# Patient Record
Sex: FEMALE | Race: BLACK OR AFRICAN AMERICAN | NOT HISPANIC OR LATINO | Employment: FULL TIME | URBAN - METROPOLITAN AREA
[De-identification: names, ages, dates, MRNs, and addresses within clinical notes are randomized per-mention and may not be internally consistent; named-entity substitution may affect disease eponyms.]

---

## 2019-02-08 ENCOUNTER — HOSPITAL ENCOUNTER (EMERGENCY)
Facility: HOSPITAL | Age: 24
Discharge: HOME | End: 2019-02-08
Attending: EMERGENCY MEDICINE | Admitting: EMERGENCY MEDICINE

## 2019-02-08 ENCOUNTER — APPOINTMENT (OUTPATIENT)
Dept: GENERAL RADIOLOGY | Facility: HOSPITAL | Age: 24
End: 2019-02-08

## 2019-02-08 VITALS
WEIGHT: 293 LBS | HEART RATE: 88 BPM | HEIGHT: 70 IN | BODY MASS INDEX: 41.95 KG/M2 | SYSTOLIC BLOOD PRESSURE: 142 MMHG | TEMPERATURE: 98.5 F | RESPIRATION RATE: 18 BRPM | DIASTOLIC BLOOD PRESSURE: 70 MMHG | OXYGEN SATURATION: 96 %

## 2019-02-08 DIAGNOSIS — S60.212A CONTUSION OF LEFT WRIST, INITIAL ENCOUNTER: ICD-10-CM
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
## 2019-02-08 DIAGNOSIS — S50.811A ABRASION OF RIGHT FOREARM, INITIAL ENCOUNTER: ICD-10-CM

## 2019-02-08 PROCEDURE — 99283 EMERGENCY DEPT VISIT LOW MDM: CPT

## 2019-02-08 PROCEDURE — 73110 X-RAY EXAM OF WRIST: CPT

## 2019-02-08 RX ORDER — FLUOXETINE 10 MG/1
10 CAPSULE ORAL DAILY
COMMUNITY

## 2019-02-08 RX ORDER — HYDROCHLOROTHIAZIDE 12.5 MG/1
12.5 CAPSULE, GELATIN COATED ORAL DAILY
COMMUNITY
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
## 2022-09-28 ENCOUNTER — TRANSCRIBE ORDERS (OUTPATIENT)
Dept: PHYSICAL THERAPY | Facility: CLINIC | Age: 27
End: 2022-09-28

## 2022-09-28 DIAGNOSIS — G89.29 CHRONIC BILATERAL LOW BACK PAIN WITH SCIATICA, SCIATICA LATERALITY UNSPECIFIED: Primary | ICD-10-CM

## 2022-09-28 DIAGNOSIS — M54.40 CHRONIC BILATERAL LOW BACK PAIN WITH SCIATICA, SCIATICA LATERALITY UNSPECIFIED: Primary | ICD-10-CM

## 2022-10-10 ENCOUNTER — TREATMENT (OUTPATIENT)
Dept: PHYSICAL THERAPY | Facility: CLINIC | Age: 27
End: 2022-10-10

## 2022-10-10 DIAGNOSIS — G89.29 CHRONIC BILATERAL LOW BACK PAIN WITH BILATERAL SCIATICA: Primary | ICD-10-CM

## 2022-10-10 DIAGNOSIS — M54.42 CHRONIC BILATERAL LOW BACK PAIN WITH BILATERAL SCIATICA: Primary | ICD-10-CM

## 2022-10-10 DIAGNOSIS — M54.41 CHRONIC BILATERAL LOW BACK PAIN WITH BILATERAL SCIATICA: Primary | ICD-10-CM

## 2022-10-10 PROCEDURE — 97110 THERAPEUTIC EXERCISES: CPT | Performed by: PHYSICAL THERAPIST

## 2022-10-10 PROCEDURE — 97112 NEUROMUSCULAR REEDUCATION: CPT | Performed by: PHYSICAL THERAPIST

## 2022-10-10 PROCEDURE — 97161 PT EVAL LOW COMPLEX 20 MIN: CPT | Performed by: PHYSICAL THERAPIST
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
## 2022-10-10 NOTE — PATIENT INSTRUCTIONS
Access Code: [email or web address]  URL: [email or web address]  Date: 10/10/2022  Prepared by: Kelli Erickson    Exercises  Supine Posterior Pelvic Tilt - 1-2 x daily - 15-30 reps  Supine March with Posterior Pelvic Tilt - 1-2 x daily - 15-30 reps  Supine Lower Trunk Rotation - 1-2 x daily - 10 reps  Supine Bridge - 1-2 x daily - 15-30 reps  Seated Piriformis Stretch - 1-2 x daily - 3 reps - 20 sec hold  Seated Figure 4 Piriformis Stretch - 1-2 x daily - 3 reps - 20 sec hold

## 2022-10-14 ENCOUNTER — TREATMENT (OUTPATIENT)
Dept: PHYSICAL THERAPY | Facility: CLINIC | Age: 27
End: 2022-10-14

## 2022-10-14 DIAGNOSIS — M54.42 CHRONIC BILATERAL LOW BACK PAIN WITH BILATERAL SCIATICA: Primary | ICD-10-CM

## 2022-10-14 DIAGNOSIS — M54.41 CHRONIC BILATERAL LOW BACK PAIN WITH BILATERAL SCIATICA: Primary | ICD-10-CM

## 2022-10-14 DIAGNOSIS — G89.29 CHRONIC BILATERAL LOW BACK PAIN WITH BILATERAL SCIATICA: Primary | ICD-10-CM

## 2022-10-14 PROCEDURE — 97112 NEUROMUSCULAR REEDUCATION: CPT | Performed by: PHYSICAL THERAPIST

## 2022-10-14 PROCEDURE — 97110 THERAPEUTIC EXERCISES: CPT | Performed by: PHYSICAL THERAPIST

## 2022-10-14 NOTE — PROGRESS NOTES
Physical Therapy Daily Treatment Note  [facility] [place] [facility]    [address] [place] [address]      Patient: Bisi Aldrich   : 1995  Referring practitioner: Sherri Paul MD  Date of Initial Visit: Type: THERAPY  Noted: 10/10/2022  Today's Date: 10/14/2022  Patient seen for 2 sessions       Visit Diagnoses:    ICD-10-CM ICD-9-CM   1. Chronic bilateral low back pain with bilateral sciatica  M54.42 724.2    M54.41 724.3    G89.29 338.29       Subjective   Bisi Aldrich reports: Was a little sore from her exercises. Experiences random low back spasms that leave her sore. Not having much pn so far this morning.    Pre tx pn score: 0  Post tx pn score: 0    Treatment  See Exercise, Manual, and Modality Logs for complete treatment.     Objective         Assessment & Plan     Assessment    Assessment details: Pt compliant w/ initial HEP and reportedly tolerating exercises well, mildly sore afterward. Continued w/ focus on low level core strengthening emphasizing trans ab activation, hip strengthening, and low back stretching. Noted significant fatigue w/ L hip exercises vs R. Reports pn w/ bridges so deferred and discussed removing from HEP for now, replacing w/ hip abd/extn. She verbalized understanding.    Plan  Plan details: Cont PT          Timed:         Manual Therapy:    0     mins  25967;     Therapeutic Exercise:    24     mins  85788;     Neuromuscular Akhil:    8    mins  76809;    Therapeutic Activity:     0     mins  84271;     Gait Trainin     mins  47476;     Ultrasound:     0     mins  08721;    Ionto                               0    mins   26694  Self Care                       0     mins   34009  Canalith Repos    0     mins 48598      Un-Timed:  Electrical Stimulation:    0     mins  19711 ( );  Dry Needling     0     mins self-pay  Traction     0     mins 69298      Timed Treatment:   32   mins   Total Treatment:     32   mins    Kelli Erickson, PT  [place] License:  #[id number]

## 2022-10-18 ENCOUNTER — TREATMENT (OUTPATIENT)
Dept: PHYSICAL THERAPY | Facility: CLINIC | Age: 27
End: 2022-10-18

## 2022-10-18 DIAGNOSIS — G89.29 CHRONIC BILATERAL LOW BACK PAIN WITH BILATERAL SCIATICA: Primary | ICD-10-CM

## 2022-10-18 DIAGNOSIS — M54.41 CHRONIC BILATERAL LOW BACK PAIN WITH BILATERAL SCIATICA: Primary | ICD-10-CM

## 2022-10-18 DIAGNOSIS — M54.42 CHRONIC BILATERAL LOW BACK PAIN WITH BILATERAL SCIATICA: Primary | ICD-10-CM

## 2022-10-18 PROCEDURE — 97110 THERAPEUTIC EXERCISES: CPT | Performed by: PHYSICAL THERAPIST

## 2022-10-18 PROCEDURE — 97112 NEUROMUSCULAR REEDUCATION: CPT | Performed by: PHYSICAL THERAPIST

## 2022-10-18 NOTE — PROGRESS NOTES
Physical Therapy Daily Treatment Note  [facility] [place] [facility]    [address] [place] [address]      Patient: Bisi Aldrich   : 1995  Referring practitioner: Sherri Paul MD  Date of Initial Visit: Type: THERAPY  Noted: 10/10/2022  Today's Date: 10/18/2022  Patient seen for 3 sessions       Visit Diagnoses:    ICD-10-CM ICD-9-CM   1. Chronic bilateral low back pain with bilateral sciatica  M54.42 724.2    M54.41 724.3    G89.29 338.29       Subjective   Bisi Aldrich reports: No issues after last visit. Hurting today because she has been dog sitting, having to valente a husky around. Feels aching across B low back, worst on R.    Pre tx pn score: 5  Post tx pn score: 3    Treatment  See Exercise, Manual, and Modality Logs for complete treatment.     Objective         Assessment & Plan     Assessment    Assessment details: No issues reported after previous visit, minimally sore. Continued w/ trunk/hip strengthening. Progressed to clamshells, resisted side steps. Pt noted fatigue but no inc in pn. Pn level reduced at end of visits. Discussed adding clams/SL hip abd to HEP. She verbalized understanding.     Plan  Plan details: Cont w/ strengthening- (+) LE glides, resisted rows standing          Timed:         Manual Therapy:    0     mins  55736;     Therapeutic Exercise:    25     mins  84705;     Neuromuscular Akhil:    10    mins  25230;    Therapeutic Activity:     0     mins  10728;     Gait Trainin     mins  09901;     Ultrasound:     0     mins  09263;    Ionto                               0    mins   32166  Self Care                       0     mins   89480  Canalith Repos    0     mins 45335      Un-Timed:  Electrical Stimulation:    0     mins  71420 ( );  Dry Needling     0     mins self-pay  Traction     0     mins 63463      Timed Treatment:   35   mins   Total Treatment:     45   mins    Kelli Erickson, PT  [place] License: #[id number]

## 2022-10-20 ENCOUNTER — TREATMENT (OUTPATIENT)
Dept: PHYSICAL THERAPY | Facility: CLINIC | Age: 27
End: 2022-10-20

## 2022-10-20 DIAGNOSIS — M54.41 CHRONIC BILATERAL LOW BACK PAIN WITH BILATERAL SCIATICA: Primary | ICD-10-CM

## 2022-10-20 DIAGNOSIS — M54.42 CHRONIC BILATERAL LOW BACK PAIN WITH BILATERAL SCIATICA: Primary | ICD-10-CM

## 2022-10-20 DIAGNOSIS — G89.29 CHRONIC BILATERAL LOW BACK PAIN WITH BILATERAL SCIATICA: Primary | ICD-10-CM

## 2022-10-20 PROCEDURE — 97110 THERAPEUTIC EXERCISES: CPT | Performed by: PHYSICAL THERAPIST

## 2022-10-20 PROCEDURE — 97112 NEUROMUSCULAR REEDUCATION: CPT | Performed by: PHYSICAL THERAPIST

## 2022-10-20 PROCEDURE — 97530 THERAPEUTIC ACTIVITIES: CPT | Performed by: PHYSICAL THERAPIST

## 2022-10-20 NOTE — PATIENT INSTRUCTIONS
Access Code: [email or web address]  URL: [email or web address]  Date: 10/20/2022  Prepared by: Kelli Erickson    Exercises  Supine Posterior Pelvic Tilt - 1-2 x daily - 10 reps  Supine March with Posterior Pelvic Tilt - 1-2 x daily - 15-30 reps  Supine Pelvic Tilt with leg glides - 2-3 x daily - 15-30 reps  Supine Lower Trunk Rotation - 1-2 x daily - 10 reps  Seated Piriformis Stretch - 1-2 x daily - 3 reps - 20 sec hold  Seated Figure 4 Piriformis Stretch - 1-2 x daily - 3 reps - 20 sec hold  Side Stepping with Resistance at Thighs - 1-2 x daily - 15-30 reps  Prone Hip Extension on Table - 1-2 x daily - 15-30 reps

## 2022-10-20 NOTE — PROGRESS NOTES
Physical Therapy Daily Treatment Note  [facility] [place] [facility]    [address] [place] [address]      Patient: Bisi Aldrich   : 1995  Referring practitioner: Sherri Paul MD  Date of Initial Visit: Type: THERAPY  Noted: 10/10/2022  Today's Date: 10/20/2022  Patient seen for 4 sessions       Visit Diagnoses:    ICD-10-CM ICD-9-CM   1. 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 Had to walk down 8 flights of stairs and stand outside for 40 mins. L hip began to hurt, not as bad as it typically would, but is sore today.     Pre tx pn score: 6  Post tx pn score: 3    Treatment  See Exercise, Manual, and Modality Logs for complete treatment.     Objective         Assessment & Plan     Assessment    Assessment details: No issues reported w/ recent exercise progressions. Continued w/ hip/core strengthening. Progressed resisted walking patterns and initiated TG squats. She reported gluteal/hip abductor fatigue w/ all, no complaints of pn. Pn level reduced by end of visit. Issued updated HEP and provided TB for home use. Pt would benefit from ongoing PT.    Plan  Plan details: Assess response to updated HEP; Cont w/ hip strengthening- (+) TB core exercises          Timed:         Manual Therapy:    0     mins  84105;     Therapeutic Exercise:    23     mins  95088;     Neuromuscular Akhil:    8    mins  33939;    Therapeutic Activity:     9     mins  01220;     Gait Trainin     mins  59318;     Ultrasound:     0     mins  03515;    Ionto                               0    mins   59308  Self Care                       0     mins   68232  Canalith Repos    0     mins 73375      Un-Timed:  Electrical Stimulation:    0     mins  56340 (MC );  Dry Needling     0     mins self-pay  Traction     0     mins 07119      Timed Treatment:   40   mins   Total  Treatment:     40   mins    Kelli Erickson, PT  [place] License: #[id number]

## 2022-10-25 ENCOUNTER — TREATMENT (OUTPATIENT)
Dept: PHYSICAL THERAPY | Facility: CLINIC | Age: 27
End: 2022-10-25

## 2022-10-25 DIAGNOSIS — G89.29 CHRONIC BILATERAL LOW BACK PAIN WITH BILATERAL SCIATICA: Primary | ICD-10-CM

## 2022-10-25 DIAGNOSIS — M54.41 CHRONIC BILATERAL LOW BACK PAIN WITH BILATERAL SCIATICA: Primary | ICD-10-CM

## 2022-10-25 DIAGNOSIS — M54.42 CHRONIC BILATERAL LOW BACK PAIN WITH BILATERAL SCIATICA: Primary | ICD-10-CM

## 2022-10-25 PROCEDURE — 97530 THERAPEUTIC ACTIVITIES: CPT | Performed by: PHYSICAL THERAPIST

## 2022-10-25 PROCEDURE — 97110 THERAPEUTIC EXERCISES: CPT | Performed by: PHYSICAL THERAPIST

## 2022-10-25 PROCEDURE — 97112 NEUROMUSCULAR REEDUCATION: CPT | Performed by: PHYSICAL THERAPIST

## 2022-10-25 NOTE — PROGRESS NOTES
Physical Therapy Daily Treatment Note  [facility] [address] [facility]    [address] [place] [address]      Patient: Bisi Aldrich   : 1995  Referring practitioner: Sherri Pual MD  Date of Initial Visit: Type: THERAPY  Noted: 10/10/2022  Today's Date: 10/25/2022  Patient seen for 5 sessions       Visit Diagnoses:    ICD-10-CM ICD-9-CM   1. Chronic bilateral low back pain with bilateral sciatica  M54.42 724.2    M54.41 724.3    G89.29 338.29       Subjective   Bisi Aldrich reports: Overall feels that pn is improved. Waking first thing in the morning has always been painful, but is less intense now. Pn mostly right sided. Starting a new job soon so will need to take a break from PT until new insurance kicks in. No issues w/ new HEP.    Pre tx pn score: 6  Post tx pn score: 3    Treatment  See Exercise, Manual, and Modality Logs for complete treatment.     Objective         Assessment & Plan     Assessment    Assessment details: Doing well w/ updated HEP issued last visit. Reports improvement in pn since start of care-more localized to R low back, worst after periods of immobility. Continued w/ progression of core/hip strengthening as outlined in chart. Added dead bugs, bird dogs-she struggled somewhat maintaining neutral spine initially but improved w/ reps. Was able to resume bridges w/o inc in pn. Would benefit from ongoing PT.    Plan  Plan details: Cont w/ progression of core strengthening, low back stretching; add standing band pulls/paloff presses; update HEP          Timed:         Manual Therapy:    0     mins  20316;     Therapeutic Exercise:    15     mins  68242;     Neuromuscular Akhil:    15    mins  04989;    Therapeutic Activity:     10     mins  47821;     Gait Trainin     mins  87236;     Ultrasound:     0     mins  62654;    Ionto                               0    mins   59521  Self Care                       0     mins   13347  Canalith Repos    0     mins  30682      Un-Timed:  Electrical Stimulation:    0     mins  86924 ( );  Dry Needling     0     mins self-pay  Traction     0     mins 10157      Timed Treatment:   40   mins   Total Treatment:     40   mins    Kelli Erickson, PT  [place] License: #[id number]

## 2022-10-27 ENCOUNTER — TREATMENT (OUTPATIENT)
Dept: PHYSICAL THERAPY | Facility: CLINIC | Age: 27
End: 2022-10-27

## 2022-10-27 DIAGNOSIS — M54.42 CHRONIC BILATERAL LOW BACK PAIN WITH BILATERAL SCIATICA: Primary | ICD-10-CM

## 2022-10-27 DIAGNOSIS — M54.41 CHRONIC BILATERAL LOW BACK PAIN WITH BILATERAL SCIATICA: Primary | ICD-10-CM

## 2022-10-27 DIAGNOSIS — G89.29 CHRONIC BILATERAL LOW BACK PAIN WITH BILATERAL SCIATICA: Primary | ICD-10-CM

## 2022-10-27 PROCEDURE — 97530 THERAPEUTIC ACTIVITIES: CPT | Performed by: PHYSICAL THERAPIST

## 2022-10-27 PROCEDURE — 97110 THERAPEUTIC EXERCISES: CPT | Performed by: PHYSICAL THERAPIST

## 2022-10-27 PROCEDURE — 97112 NEUROMUSCULAR REEDUCATION: CPT | Performed by: PHYSICAL THERAPIST

## 2022-10-27 NOTE — PATIENT INSTRUCTIONS
Access Code: [email or web address]  URL: [email or web address]  Date: 10/27/2022  Prepared by: Kelli Erickson    Exercises  Supine Pelvic Tilt with leg glides - 1 x daily - 15-30 reps  Beginner Bridge - 1 x daily - 15-30 reps  Dead Bug - 1 x daily - 15-30 reps  Supine Dead Bug with Leg Extension - 1 x daily - 15-30 reps  Bird Dog - 1 x daily - 15-30 reps  Side Stepping with Resistance at Thighs - 1 x daily - 15-30 reps  Standing Anti-Rotation Press with Anchored Resistance - 1 x daily - 15-30 reps

## 2022-10-27 NOTE — PROGRESS NOTES
"Physical Therapy Daily Treatment Note  [facility] [address]    [address] [place] [address]      Patient: Bisi Aldrich   : 1995  Referring practitioner: Sherri Paul MD  Date of Initial Visit: Type: THERAPY  Noted: 10/10/2022  Today's Date: 10/27/2022  Patient seen for 6 sessions       Visit Diagnoses:    ICD-10-CM ICD-9-CM   1. Chronic bilateral low back pain with bilateral sciatica  M54.42 724.2    M54.41 724.3    G89.29 338.29       Subjective   Bisi Aldrich reports: Woke this morning to walk to the bathroom and actually forgot about her pn. \"My back has honestly never felt better.\" Still feels sore from time to time.    Pre tx pn score: 3  Post tx pn score: 2    Treatment  See Exercise, Manual, and Modality Logs for complete treatment.     Objective         Assessment & Plan     Assessment    Assessment details: LBP improving. Continued w/ core and hip strengthening progressions, low back stretching. Demo good carryover w/ progressions from last visit. Demo good control w/ bird dogs, subjectively reports challenge. Incorporated paloff presses to facilitate deep core stab function in standing. She completed all w/o pn and again reported slight reduction in pn by end of visit. Pt requests working on home exercises for next couple of weeks until new insurance becomes active. Issued/reviewed updated HEP and provided TB for home use.    Plan  Plan details: Functional strengthening/core stab          Timed:         Manual Therapy:    0     mins  97896;     Therapeutic Exercise:    15     mins  51878;     Neuromuscular Akhil:    10    mins  66151;    Therapeutic Activity:     10     mins  95015;     Gait Trainin     mins  98214;     Ultrasound:     0     mins  44149;    Ionto                               0    mins   48827  Self Care                       0     mins   19805  Canalith Repos    0     mins 68623      Un-Timed:  Electrical Stimulation:    0     mins  75733 ( " );  Dry Needling     0     mins self-pay  Traction     0     mins 54366      Timed Treatment:   35   mins   Total Treatment:     35   mins    Kelli Erickson, PT  [place] License: #[id number]

## 2025-06-09 RX ORDER — AMLODIPINE BESYLATE 10 MG/1
10 TABLET ORAL DAILY
COMMUNITY
Start: 2022-10-12 | End: 2025-10-20

## 2025-06-09 RX ORDER — PRENATAL VIT/IRON FUM/FOLIC AC 27MG-0.8MG
1 TABLET ORAL DAILY
COMMUNITY
Start: 2025-04-23

## 2025-06-09 RX ORDER — CHLORTHALIDONE 25 MG/1
25 TABLET ORAL DAILY
COMMUNITY
Start: 2022-07-15

## 2025-07-07 NOTE — PROGRESS NOTES
"  [facility]  [address]  [place] [address]   Office Note      Date: 2025  Patient Name: Bisi Aldrich  MRN: [id number]  : 1995  Subjective  Subjective     Chief Complaint  Obesity Management consult, nutrition counseling          Bisi Aldrich presents to [facility] for obesity management. She is a self-referral. Co-existing RUBINA, HTN, possible PCOS. Goal <300lb and improvement in health. She has never taken medications to treat her weight.     Weight history:  Reports that she has been overweight for more than 15 years. Her weight started to increase above her normal in the \"mid to high 200s\" in college when she was less active, played basketball in high school. She has tried multiple diet and exercise programs in the past including Noom, weight watchers, , Guru. Was most successful with guru program -2024- personal training program with diet plan- and lost about 35lb then injured her knee exercising and gained the weight back.   Highest lifetime weight: 385 pounds. Today's weight is (!) 175 kg (386 lb) pounds.   Weight 5 years ago: 330    Most recently she started at [employer] and works with a  lifting weights for about an hour twice a week.     Current lifestyle:   The following seem to sabotage weight loss efforts:comfort/stress eating, enjoyment of food, skipping meals, eating late, specific cravings like carbohydrates, mindless eating (snacking while working or watching TV), boredom eating, and poor sleep    Typical diet:  Breakfast: egg bites from [employer] and a refresher drink  Lunch: chicken sandwich or turkey burger  Dinner: protein and veggie or burger and fries  Snack:apples and PB    Pertinent medical history:  Depression/anxiety: was rx initially in 2018. Just restarted in February after being off for 2 years (lost insurance coverage). Took wellbuturin but can't " "recall much about it.   RUBINA: HST 2 years ago results were normal but did have an abnormal result in May 2025. Started on CPAP last week- managed by sleep specialist at [facility].  She has had ovarian polyps in the past but denies cysts or history of PCOS.   Pertinent family history:    Review of Systems   Constitutional:  Positive for fatigue.        Positive for weight gain   HENT:  Negative for trouble swallowing.         Negative for throat swelling   Respiratory:  Positive for apnea. Negative for shortness of breath and wheezing.         Negative for snoring   Cardiovascular:  Negative for chest pain, palpitations and leg swelling.   Gastrointestinal:  Negative for abdominal pain, constipation, diarrhea, GERD and indigestion.   Endocrine: Negative for cold intolerance, heat intolerance, polydipsia, polyphagia and polyuria.        Negative for loss of hair  Negative for hirsutism     Genitourinary:         Denies menstrual irregularities   Musculoskeletal:  Negative for arthralgias.        Denies exercise limitations  Denies chronic pain   Skin:  Negative for dry skin.        Negative for acne   Neurological:  Positive for memory problem. Negative for headache.        Negative for paresthesias   Psychiatric/Behavioral:  Positive for self-injury and depressed mood. Negative for sleep disturbance and suicidal ideas. The patient is nervous/anxious.        PHQ-9 Total Score: 15          Objective     Body mass index is 54.6 kg/m².   Body composition analysis completed and showed:   Body Fat %: 57.7%     Measurements (in inches)  Measurements (in inches) Waist Circumference: 53.5   Neck: 16.5  Chest: 58  Hips: 63  Thighs: 53    Vital Signs:   /84   Pulse 88   Resp 18   Ht 179.1 cm (70.5\")   Wt (!) 175 kg (386 lb)   SpO2 98%   BMI 54.60 kg/m²     Physical Exam   General appears stated age and normal appearance   HEENT Mallampati score III or IV, enlarged neck circumference, PERRLA, EOM intact, " conjunctivae normal, and without thyromegaly or thyroid nodules   Chest/lungs Normal rate, Regular rhythm, Breathing is unlabored, and Clear to auscultation bilaterally   Abdomen Hypopigmented linear striae, Soft, normal bowel sounds, without mass or tenderness, and Central adiposity   Extremities without edema   Neuro Good historian and No focal deficit   Skin Acanthosis nigricans and Warm, dry, intact   Psych normal behavior, normal thought content, and normal concentration     Result Review :   The following data was reviewed by: GISELA Bailey on 07/09/2025:  External labwork from 04/2025                Assessment / Plan       Diagnoses and all orders for this visit:    1. Class 3 severe obesity due to excess calories with serious comorbidity and body mass index (BMI) of 50.0 to 59.9 in adult (Primary)  Assessment & Plan:  Patient's (Body mass index is 54.6 kg/m².) indicates that they are morbidly/severely obese (BMI > 40 or > 35 with obesity - related health condition) with health conditions that include obstructive sleep apnea and hypertension . Weight is worsening. BMI  is above average; BMI management plan is completed. We discussed low calorie, low carb based diet program, portion control, increasing exercise, management of depression/anxiety/stress to control compensatory eating, and an deena-based approach such as [employer] Pal or Lose It.     Topics of discussion included obesity as a disease, nutritional education on food groups, exercise, and medications. Patient was instructed in adequate protein, controlled carb and controlled fat intake.   Patient received instructions on using the medicines as a tool in controlling their weight with nutritional and behavioral changes. Risks and benefits were discussed. I believe the potential benefits of medication helping to decrease weight outweighs the risks. Patient is to try nutritonal/behavioral changes only first.   Patient received our clinic education  booklet.   Our patient consent form was reviewed including potential risks of weight loss. We also reviewed our confidentiality and HIPPA statements. Patients current FITT score was reviewed along with current capability for exercise tolerance and a patient will work towards a FITT score of:     Frequency   Intensity Time Strength Training   []   0 None  []   0 None  []   0 None  []   0 None    []   1 (1-2x/week) []   1 (light) []   1 (<10 min) []   1 (1x/week)   [x]   2 (3-5x/week) [x]   2 (moderate) []   2 (10-20 min) [x]   2 (2x/week)   []   3 (daily)   []   3 (moderately hard)  []   4 (very hard) []   3 (20-30 min)  [x]   4 (>30 min) []   3 (3-4x/week)       Patient's past medical history was reviewed in detail and barriers to weight loss were identified and discussed. Past efforts at weight reduction on their own as well as under physician supervision were documented and discussed.  I advised patient to continue routine care with their Primary Care Provider.     Nutritional recommendations and goals were reviewed including Calories:9343-1544 daily adjusted for exercise calories burnt, Protein:108-136 g daily, Net carbs (total carb - fiber) of 50-75g per day.  Start to keep a food journal and bring into next visit in 2 weeks for review. Practice the behavioral modification technique of mindful eating. Take one MVI daily and 2000mg fish oil daily. Take other medications and supplements as directed.  - Complete fasting labwork today.   - Reviewed AOM: candidate for all meds. Caution phentermine due to HTN. Wegovy, qsymia, orlistat, phentermine preferred by insurance. Her weight loss goal for treatment of RUBINA and HTN is >20%, zepbound is the preferred starting treatment due to coexisting RUBINA but not covered. Will plan to start with wegovy at next visit.  Advised her to enroll in required [facility] healthcoach program.   - Treatment goal  >20% loss of beginning body weight, <300lb.     Orders:  -     Insulin, Total  -      Comprehensive Metabolic Panel  -     Lipid Panel  -     TSH  -     CBC (No Diff)  -     Vitamin D,25-Hydroxy  -     Vitamin B12    2. RUBINA on CPAP  Overview:  HST 05/22/2025 @  mild RUBINA with AHI 8.0    Assessment & Plan:  Recently diagnosed, started CPAP 1 week ago. Increased usage from 4 hours to 6 and is starting to feel somewhat better.     Orders:  -     Comprehensive Metabolic Panel    3. Benign essential hypertension  Assessment & Plan:  Hypertension is stable and controlled  Continue current treatment regimen.  Weight loss.  Regular aerobic exercise.  Ambulatory blood pressure monitoring.  Blood pressure will be reassessed in 1 month.    Orders:  -     Comprehensive Metabolic Panel  -     Lipid Panel  -     TSH    4. Anxiety and depression  Assessment & Plan:  Patient's depression is a recurrent episode that is severe without psychosis. Depression is active and stable.    Plan:   Continue current medication therapy   Managed by PCP. Consider wellbutrin. Has taken before, can't recall anything about it.     Followup at the next regular appointment.     Orders:  -     Comprehensive Metabolic Panel  -     TSH    5. Eczema, unspecified type    6. Chronic fatigue  -     Comprehensive Metabolic Panel  -     TSH  -     CBC (No Diff)  -     Vitamin D,25-Hydroxy  -     Vitamin B12    7. Malnutrition screen  -     Comprehensive Metabolic Panel  -     TSH  -     CBC (No Diff)  -     Vitamin D,25-Hydroxy  -     Vitamin B12    8. Encounter for therapeutic drug level monitoring  -     Urine Drug Screen - Urine, Clean Catch        I spent 61 minutes caring for Bisi on this date of service. This time includes time spent by me in the following activities:preparing for the visit, reviewing tests, obtaining and/or reviewing a separately obtained history, performing a medically appropriate examination and/or evaluation , counseling and educating the patient/family/caregiver, ordering medications, tests, or procedures, and  documenting information in the medical record  Follow Up   Return in about 2 weeks (around 7/23/2025) for Labs this visit, Next scheduled follow up.  Patient was given instructions and counseling regarding her condition or for health maintenance advice. Please see specific information pulled into the AVS if appropriate.     GISELA Giron  07/09/2025

## 2025-07-09 ENCOUNTER — OFFICE VISIT (OUTPATIENT)
Dept: BARIATRICS/WEIGHT MGMT | Facility: CLINIC | Age: 30
End: 2025-07-09
Payer: COMMERCIAL

## 2025-07-09 VITALS
DIASTOLIC BLOOD PRESSURE: 84 MMHG | BODY MASS INDEX: 41.02 KG/M2 | HEART RATE: 88 BPM | SYSTOLIC BLOOD PRESSURE: 124 MMHG | OXYGEN SATURATION: 98 % | HEIGHT: 71 IN | RESPIRATION RATE: 18 BRPM | WEIGHT: 293 LBS

## 2025-07-09 DIAGNOSIS — I10 BENIGN ESSENTIAL HYPERTENSION: ICD-10-CM

## 2025-07-09 DIAGNOSIS — G47.33 OSA ON CPAP: ICD-10-CM

## 2025-07-09 DIAGNOSIS — E66.813 CLASS 3 SEVERE OBESITY DUE TO EXCESS CALORIES WITH SERIOUS COMORBIDITY AND BODY MASS INDEX (BMI) OF 50.0 TO 59.9 IN ADULT: Primary | ICD-10-CM

## 2025-07-09 DIAGNOSIS — L30.9 ECZEMA, UNSPECIFIED TYPE: ICD-10-CM

## 2025-07-09 DIAGNOSIS — F41.9 ANXIETY AND DEPRESSION: ICD-10-CM

## 2025-07-09 DIAGNOSIS — Z51.81 ENCOUNTER FOR THERAPEUTIC DRUG LEVEL MONITORING: ICD-10-CM

## 2025-07-09 DIAGNOSIS — F32.A ANXIETY AND DEPRESSION: ICD-10-CM

## 2025-07-09 DIAGNOSIS — Z13.21 MALNUTRITION SCREEN: ICD-10-CM

## 2025-07-09 DIAGNOSIS — R53.82 CHRONIC FATIGUE: ICD-10-CM

## 2025-07-09 PROBLEM — E66.01 MORBID OBESITY: Status: ACTIVE | Noted: 2022-08-15

## 2025-07-09 NOTE — ASSESSMENT & PLAN NOTE
Patient's depression is a recurrent episode that is severe without psychosis. Depression is active and stable.    Plan:   Continue current medication therapy   Managed by PCP. Consider wellbutrin. Has taken before, can't recall anything about it.     Followup at the next regular appointment.

## 2025-07-09 NOTE — ASSESSMENT & PLAN NOTE
Patient's (Body mass index is 54.6 kg/m².) indicates that they are morbidly/severely obese (BMI > 40 or > 35 with obesity - related health condition) with health conditions that include obstructive sleep apnea and hypertension . Weight is worsening. BMI  is above average; BMI management plan is completed. We discussed low calorie, low carb based diet program, portion control, increasing exercise, management of depression/anxiety/stress to control compensatory eating, and an deena-based approach such as [employer] Pal or Lose It.     Topics of discussion included obesity as a disease, nutritional education on food groups, exercise, and medications. Patient was instructed in adequate protein, controlled carb and controlled fat intake.   Patient received instructions on using the medicines as a tool in controlling their weight with nutritional and behavioral changes. Risks and benefits were discussed. I believe the potential benefits of medication helping to decrease weight outweighs the risks. Patient is to try nutritonal/behavioral changes only first.   Patient received our clinic education booklet.   Our patient consent form was reviewed including potential risks of weight loss. We also reviewed our confidentiality and HIPPA statements. Patients current FITT score was reviewed along with current capability for exercise tolerance and a patient will work towards a FITT score of:     Frequency   Intensity Time Strength Training   []   0 None  []   0 None  []   0 None  []   0 None    []   1 (1-2x/week) []   1 (light) []   1 (<10 min) []   1 (1x/week)   [x]   2 (3-5x/week) [x]   2 (moderate) []   2 (10-20 min) [x]   2 (2x/week)   []   3 (daily)   []   3 (moderately hard)  []   4 (very hard) []   3 (20-30 min)  [x]   4 (>30 min) []   3 (3-4x/week)       Patient's past medical history was reviewed in detail and barriers to weight loss were identified and discussed. Past efforts at weight reduction on their own as well as  under physician supervision were documented and discussed.  I advised patient to continue routine care with their Primary Care Provider.     Nutritional recommendations and goals were reviewed including Calories:8553-3600 daily adjusted for exercise calories burnt, Protein:108-136 g daily, Net carbs (total carb - fiber) of 50-75g per day.  Start to keep a food journal and bring into next visit in 2 weeks for review. Practice the behavioral modification technique of mindful eating. Take one MVI daily and 2000mg fish oil daily. Take other medications and supplements as directed.  - Complete fasting labwork today.   - Reviewed AOM: candidate for all meds. Caution phentermine due to HTN. Wegovy, qsymia, orlistat, phentermine preferred by insurance. Her weight loss goal for treatment of RUBINA and HTN is >20%, zepbound is the preferred starting treatment due to coexisting RUBINA but not covered. Will plan to start with wegovy at next visit.  Advised her to enroll in required [facility] healthcoach program.   - Treatment goal  >20% loss of beginning body weight, <300lb.

## 2025-07-09 NOTE — ASSESSMENT & PLAN NOTE
Recently diagnosed, started CPAP 1 week ago. Increased usage from 4 hours to 6 and is starting to feel somewhat better.

## 2025-07-10 LAB
25(OH)D3+25(OH)D2 SERPL-MCNC: 17.5 NG/ML (ref 30–100)
ALBUMIN SERPL-MCNC: 4.1 G/DL (ref 3.5–5.2)
ALBUMIN/GLOB SERPL: 1.3 G/DL
ALP SERPL-CCNC: 81 U/L (ref 39–117)
ALT SERPL-CCNC: 14 U/L (ref 1–33)
AMPHETAMINES UR QL SCN: NEGATIVE NG/ML
AST SERPL-CCNC: 19 U/L (ref 1–32)
BARBITURATES UR QL SCN: NEGATIVE NG/ML
BENZODIAZ UR QL SCN: NEGATIVE NG/ML
BILIRUB SERPL-MCNC: 0.2 MG/DL (ref 0–1.2)
BUN SERPL-MCNC: 9 MG/DL (ref 6–20)
BUN/CREAT SERPL: 10.2 (ref 7–25)
BZE UR QL SCN: NEGATIVE NG/ML
CALCIUM SERPL-MCNC: 9 MG/DL (ref 8.6–10.5)
CANNABINOIDS UR QL SCN: NEGATIVE NG/ML
CHLORIDE SERPL-SCNC: 104 MMOL/L (ref 98–107)
CHOLEST SERPL-MCNC: 186 MG/DL (ref 0–200)
CO2 SERPL-SCNC: 24.8 MMOL/L (ref 22–29)
CREAT SERPL-MCNC: 0.88 MG/DL (ref 0.57–1)
CREAT UR-MCNC: 257.8 MG/DL (ref 20–300)
EGFRCR SERPLBLD CKD-EPI 2021: 90.8 ML/MIN/1.73
ERYTHROCYTE [DISTWIDTH] IN BLOOD BY AUTOMATED COUNT: 16.3 % (ref 12.3–15.4)
GLOBULIN SER CALC-MCNC: 3.1 GM/DL
GLUCOSE SERPL-MCNC: 88 MG/DL (ref 65–99)
HCT VFR BLD AUTO: 37.5 % (ref 34–46.6)
HDLC SERPL-MCNC: 63 MG/DL (ref 40–60)
HGB BLD-MCNC: 11.7 G/DL (ref 12–15.9)
INSULIN SERPL-ACNC: 26.4 UIU/ML (ref 2.6–24.9)
LABORATORY COMMENT REPORT: NORMAL
LDLC SERPL CALC-MCNC: 105 MG/DL (ref 0–100)
MCH RBC QN AUTO: 24.2 PG (ref 26.6–33)
MCHC RBC AUTO-ENTMCNC: 31.2 G/DL (ref 31.5–35.7)
MCV RBC AUTO: 77.5 FL (ref 79–97)
METHADONE UR QL SCN: NEGATIVE NG/ML
OPIATES UR QL SCN: NEGATIVE NG/ML
OXYCODONE+OXYMORPHONE UR QL SCN: NEGATIVE NG/ML
PCP UR QL: NEGATIVE NG/ML
PH UR: 5.6 [PH] (ref 4.5–8.9)
PLATELET # BLD AUTO: 307 10*3/MM3 (ref 140–450)
POTASSIUM SERPL-SCNC: 3.8 MMOL/L (ref 3.5–5.2)
PROPOXYPH UR QL SCN: NEGATIVE NG/ML
PROT SERPL-MCNC: 7.2 G/DL (ref 6–8.5)
RBC # BLD AUTO: 4.84 10*6/MM3 (ref 3.77–5.28)
SODIUM SERPL-SCNC: 140 MMOL/L (ref 136–145)
TRIGL SERPL-MCNC: 100 MG/DL (ref 0–150)
TSH SERPL DL<=0.005 MIU/L-ACNC: 1.62 UIU/ML (ref 0.27–4.2)
VIT B12 SERPL-MCNC: 727 PG/ML (ref 211–946)
VLDLC SERPL CALC-MCNC: 18 MG/DL (ref 5–40)
WBC # BLD AUTO: 6.66 10*3/MM3 (ref 3.4–10.8)

## 2025-07-23 ENCOUNTER — OFFICE VISIT (OUTPATIENT)
Dept: BARIATRICS/WEIGHT MGMT | Facility: CLINIC | Age: 30
End: 2025-07-23
Payer: COMMERCIAL

## 2025-07-23 VITALS
WEIGHT: 293 LBS | HEART RATE: 76 BPM | SYSTOLIC BLOOD PRESSURE: 120 MMHG | BODY MASS INDEX: 41.02 KG/M2 | HEIGHT: 71 IN | DIASTOLIC BLOOD PRESSURE: 72 MMHG

## 2025-07-23 DIAGNOSIS — D50.9 IRON DEFICIENCY ANEMIA, UNSPECIFIED IRON DEFICIENCY ANEMIA TYPE: ICD-10-CM

## 2025-07-23 DIAGNOSIS — E66.813 CLASS 3 SEVERE OBESITY DUE TO EXCESS CALORIES WITH SERIOUS COMORBIDITY AND BODY MASS INDEX (BMI) OF 50.0 TO 59.9 IN ADULT: Primary | ICD-10-CM

## 2025-07-23 DIAGNOSIS — E55.9 VITAMIN D DEFICIENCY: ICD-10-CM

## 2025-07-23 DIAGNOSIS — R25.2 MUSCLE CRAMP: ICD-10-CM

## 2025-07-23 PROCEDURE — 99214 OFFICE O/P EST MOD 30 MIN: CPT | Performed by: NURSE PRACTITIONER

## 2025-07-23 RX ORDER — SEMAGLUTIDE 0.25 MG/.5ML
0.25 INJECTION, SOLUTION SUBCUTANEOUS WEEKLY
Qty: 2 ML | Refills: 0 | COMMUNITY
Start: 2025-07-23

## 2025-07-23 RX ORDER — SEMAGLUTIDE 0.5 MG/.5ML
0.5 INJECTION, SOLUTION SUBCUTANEOUS WEEKLY
Qty: 2 ML | Refills: 0 | Status: SHIPPED | OUTPATIENT
Start: 2025-07-23

## 2025-07-23 RX ORDER — ERGOCALCIFEROL 1.25 MG/1
CAPSULE, LIQUID FILLED ORAL
Qty: 24 CAPSULE | Refills: 0 | Status: SHIPPED | OUTPATIENT
Start: 2025-07-23

## 2025-07-23 RX ORDER — MAGNESIUM GLUCONATE 30 MG(550)
595 TABLET ORAL DAILY
Start: 2025-07-23

## 2025-07-23 NOTE — ASSESSMENT & PLAN NOTE
Patient's (Body mass index is 54.32 kg/m².) indicates that they are morbidly/severely obese (BMI > 40 or > 35 with obesity - related health condition) with health conditions that include obstructive sleep apnea and hypertension . Weight is improving with lifestyle modifications. BMI  is above average; BMI management plan is completed. We discussed low calorie, low carb based diet program, portion control, increasing exercise, pharmacologic options including wegovy, and an deena-based approach such as [employer] Pal or Lose It.     I have instructed the patient to continue with pursuit of medical weight loss as a part of this program. Patient does meet criteria for use of anorectics at this time as BMI >30  and is not at treatment goal.     The current plan for this month includes:   - Continue to work on lifestyle behavioral changes  - Start wegovy.   Start Wegovy. Denies family or personal history of pancreatitis, MTC, or MEN 2. Discussed common side effects of nausea, diarrhea, vomiting, constipation, stomach pain, headache, fatigue, upset stomach, dizziness, feeling bloated, belching, gas, stomach flu, heartburn.   First injection of Wegovy 0.25mg was administered in the office today right upper arm, no complications. Sample pen provided to patient along with content regarding use of the pen, dosing schedule, savings opportunities, and helpful tips.   - Treatment goal >20% loss of beginning body weight, <300lb.

## 2025-07-23 NOTE — ASSESSMENT & PLAN NOTE
We discussed that adipose tissue sequesters Vitamin D and it often coexists with obesity. Treat with prescription dosing. Reassess in 6 months.

## 2025-07-23 NOTE — PROGRESS NOTES
[facility]  [address]  [place] [address]     Office Note      Date: 2025  Patient Name: Bisi Aldrich  MRN: [id number]  : 1995  Subjective  Subjective     Chief Complaint  Obesity Management follow-up          Bisi Aldrich presents to [facility] for obesity management. This is her initial 2 week follow up. She is working on the following lifestyle changes: keeping a food journal, decreasing carb intake.   Patient is unsure with weight loss progress. Appetite is moderately controlled. Reports no side effects of prescribed medications today. The patient is taking multivitamin and is not taking fish oil. The patient is using a food journal.      24 hour recall: 25  Calories: 1259  Carbs: 117  Fiber: 9  Protein: 69  Water Intake: 120 oz  Other beverages: Poppi soda (1) diet soda occasionally, [employer] Refresher 2 times weekly  Alcohol: occasionally    The patient is exercising by walking and on Mon and Wed sees a  for strength training for an hour with a FITT score of:    Frequency Intensity Time Strength Training   []   0, none []   0 []   0 []   0   [x]   1 (1-2x/week) [x]   1 (light) []   1 (<10 min) []   1 (1x/week)   []   2 (3-5x/week) []   2 (moderate) []   2 (10-20 min) [x]   2 (2x/week)   []   3 (daily) []   3 (moderately hard)  []   4 (very hard) []   3 (20-30 min)  [x]   4 (>30 min) []   3 (3-4x/week)           Review of Systems   Constitutional:  Negative for appetite change and fatigue.   Eyes:  Negative for visual disturbance.   Cardiovascular:  Negative for chest pain and palpitations.   Gastrointestinal:  Negative for constipation and indigestion.   Neurological:  Negative for light-headedness.         Current Outpatient Medications:     amLODIPine (NORVASC) 10 MG tablet, Take 1 tablet by mouth Daily., Disp: , Rfl:     chlorthalidone (HYGROTON) 25 MG tablet, Take 1 tablet by mouth  "Daily., Disp: , Rfl:     FLUoxetine (PROzac) 40 MG capsule, Take 1 capsule by mouth Daily., Disp: , Rfl:     Levonorgestrel (MIRENA) 20 MCG/DAY intrauterine device IUD, 1 each by Intrauterine route 1 time., Disp: , Rfl:     Prenatal Vit-Fe Fumarate-FA (prenatal vitamin 27-0.8) 27-0.8 MG tablet tablet, Take 1 tablet by mouth Daily., Disp: , Rfl:     potassium gluconate 595 (99 K) MG tablet tablet, Take 1 tablet by mouth Daily., Disp: , Rfl:     Semaglutide-Weight Management (Wegovy) 0.25 MG/0.5ML solution auto-injector, Inject 0.5 mL under the skin into the appropriate area as directed 1 (One) Time Per Week., Disp: 2 mL, Rfl: 0    Semaglutide-Weight Management (Wegovy) 0.5 MG/0.5ML solution auto-injector, Inject 0.5 mL under the skin into the appropriate area as directed 1 (One) Time Per Week., Disp: 2 mL, Rfl: 0    vitamin D (ERGOCALCIFEROL) 1.25 MG (05007 UT) capsule capsule, Take one capsule by mouth twice weekly. Take one capsule on Monday and one capsule on Thursday., Disp: 24 capsule, Rfl: 0    Objective   Start weight: 386 pounds.    Total weight loss: -2 pounds/-0.51%  Change in weight since last visit: -2lb    Body mass index is 54.32 kg/m².   Body composition analysis completed and showed:   Body Fat %: 56.4    Measurements (in inches)  Waist Circumference: 54      Vital Signs:   /72 (BP Location: Right arm, Patient Position: Sitting)   Pulse 76   Ht 179.1 cm (70.5\")   Wt (!) 174 kg (384 lb)   BMI 54.32 kg/m²     Patient's last menstrual period was 07/19/2025.    Physical Exam   General appears stated age and normal appearance   HEENT PERRLA, EOM intact, and conjunctivae normal   Chest/lungs Normal rate, Regular rhythm, and Breathing is unlabored   Extremities nonpitting edema   Neuro Good historian and No focal deficit   Skin Warm, dry, intact   Psych normal behavior, normal thought content, and normal concentration     Result Review :   The following data was reviewed by: GISELA Bailey " on 07/23/2025:  Office Visit on 07/09/2025   Component Date Value Ref Range Status    Insulin 07/09/2025 26.4 (H)  2.6 - 24.9 uIU/mL Final    Glucose 07/09/2025 88  65 - 99 mg/dL Final    BUN 07/09/2025 9.0  6.0 - 20.0 mg/dL Final    Creatinine 07/09/2025 0.88  0.57 - 1.00 mg/dL Final    EGFR Result 07/09/2025 90.8  >60.0 mL/min/1.73 Final    Comment: GFR Categories in Chronic Kidney Disease (CKD)  GFR Category          GFR (mL/min/1.73)    Interpretation  G1                    90 or greater        Normal or high  (1)  G2                    60-89                Mild decrease  (1)  G3a                   45-59                Mild to moderate  decrease  G3b                   30-44                Moderate to  severe decrease  G4                    15-29                Severe decrease  G5                    14 or less           Kidney failure  (1)In the absence of evidence of kidney disease, neither  GFR category G1 or G2 fulfill the criteria for CKD.  eGFR calculation 2021 CKD-EPI creatinine equation, which  does not include race as a factor      BUN/Creatinine Ratio 07/09/2025 10.2  7.0 - 25.0 Final    Sodium 07/09/2025 140  136 - 145 mmol/L Final    Potassium 07/09/2025 3.8  3.5 - 5.2 mmol/L Final    Chloride 07/09/2025 104  98 - 107 mmol/L Final    Total CO2 07/09/2025 24.8  22.0 - 29.0 mmol/L Final    Calcium 07/09/2025 9.0  8.6 - 10.5 mg/dL Final    Total Protein 07/09/2025 7.2  6.0 - 8.5 g/dL Final    Albumin 07/09/2025 4.1  3.5 - 5.2 g/dL Final    Globulin 07/09/2025 3.1  gm/dL Final    A/G Ratio 07/09/2025 1.3  g/dL Final    Total Bilirubin 07/09/2025 0.2  0.0 - 1.2 mg/dL Final    Alkaline Phosphatase 07/09/2025 81  39 - 117 U/L Final    AST (SGOT) 07/09/2025 19  1 - 32 U/L Final    ALT (SGPT) 07/09/2025 14  1 - 33 U/L Final    Total Cholesterol 07/09/2025 186  0 - 200 mg/dL Final    Comment: Cholesterol Reference Ranges  (U.S. Department of Health and Human Services ATP III  Classifications)  Desirable           <200 mg/dL  Borderline High    200-239 mg/dL  High Risk          >240 mg/dL  Triglyceride Reference Ranges  (U.S. Department of Health and Human Services ATP III  Classifications)  Normal           <150 mg/dL  Borderline High  150-199 mg/dL  High             200-499 mg/dL  Very High        >500 mg/dL  HDL Reference Ranges  (U.S. Department of Health and Human Services ATP III  Classifications)  Low     <40 mg/dl (major risk factor for CHD)  High    >60 mg/dl ('negative' risk factor for CHD)  LDL Reference Ranges  (U.S. Department of Health and Human Services ATP III  Classifications)  Optimal          <100 mg/dL  Near Optimal     100-129 mg/dL  Borderline High  130-159 mg/dL  High             160-189 mg/dL  Very High        >189 mg/dL  LDL is calculated using the [facility] LDL-C calculation.      Triglycerides 07/09/2025 100  0 - 150 mg/dL Final    HDL Cholesterol 07/09/2025 63 (H)  40 - 60 mg/dL Final    VLDL Cholesterol Elmer 07/09/2025 18  5 - 40 mg/dL Final    LDL Chol Calc ([facility]) 07/09/2025 105 (H)  0 - 100 mg/dL Final    TSH 07/09/2025 1.620  0.270 - 4.200 uIU/mL Final    WBC 07/09/2025 6.66  3.40 - 10.80 10*3/mm3 Final    RBC 07/09/2025 4.84  3.77 - 5.28 10*6/mm3 Final    Hemoglobin 07/09/2025 11.7 (L)  12.0 - 15.9 g/dL Final    Hematocrit 07/09/2025 37.5  34.0 - 46.6 % Final    MCV 07/09/2025 77.5 (L)  79.0 - 97.0 fL Final    MCH 07/09/2025 24.2 (L)  26.6 - 33.0 pg Final    MCHC 07/09/2025 31.2 (L)  31.5 - 35.7 g/dL Final    RDW 07/09/2025 16.3 (H)  12.3 - 15.4 % Final    Platelets 07/09/2025 307  140 - 450 10*3/mm3 Final    Amphetamine, Urine Qual 07/09/2025 Negative  Fgrwcj=4519 ng/mL Final    Barbiturates Screen, Urine 07/09/2025 Negative  Mvaekd=321 ng/mL Final    Benzodiazepine Screen, Urine 07/09/2025 Negative  Dmntvo=976 ng/mL Final    THC Screen, Urine 07/09/2025 Negative  Cutoff=20 ng/mL Final    Cocaine Screen, Urine 07/09/2025 Negative  Xzmdmv=153 ng/mL Final    Opiate Screen, Urine 07/09/2025 Negative   Jywamf=202 ng/mL Final    Opiate test includes Codeine, Morphine, Hydromorphone, Hydrocodone.    Oxycodone/Oxymorphone, Urine 07/09/2025 Negative  Ahplfx=360 ng/mL Final    Test includes Oxycodone and Oxymorphone    Phencyclidine (PCP), Urine 07/09/2025 Negative  Cutoff=25 ng/mL Final    Methadone Screen, Urine 07/09/2025 Negative  Jmyzzv=610 ng/mL Final    Propoxyphene Screen 07/09/2025 Negative  Thjnjh=564 ng/mL Final    Creatinine, Urine 07/09/2025 257.8  20.0 - 300.0 mg/dL Final    pH, UA 07/09/2025 5.6  4.5 - 8.9 Final    Please note 07/09/2025 Comment   Final    Comment: This assay provides a preliminary unconfirmed analytical test  result that may be suitable for clinical management of patients  in certain situations. Drug-test results should be interpreted  in the context of clinical information. Patient metabolic  variables, specific drug chemistry, and specimen  characteristics can affect test outcome. Technical consultation  is available if a test result is inconsistent with an expected  outcome.    Email:  [email or web address]    Phone:  [phone]      25 Hydroxy, Vitamin D 07/09/2025 17.5 (L)  30.0 - 100.0 ng/ml Final    Comment: Reference Range for Total Vitamin D 25(OH)  Deficiency <20.0 ng/mL  Insufficiency 21-29 ng/mL  Sufficiency  ng/mL  Toxicity >100 ng/ml      Vitamin B-12 07/09/2025 727  211 - 946 pg/mL Final    Results may be falsely increased if patient taking Biotin.                  Assessment / Plan        Diagnoses and all orders for this visit:    1. Class 3 severe obesity due to excess calories with serious comorbidity and body mass index (BMI) of 50.0 to 59.9 in adult (Primary)  Overview:  AOM: candidate for all meds. Caution phentermine due to HTN. Wegovy, qsymia, orlistat, phentermine preferred by insurance. Her weight loss goal for treatment of RUBINA and HTN is >20%, zepbound is the preferred starting treatment due to coexisting RUBINA but not covered.   7/23/25: start  wegovy    Comorbidities: RUBINA, HTN    Assessment & Plan:  Patient's (Body mass index is 54.32 kg/m².) indicates that they are morbidly/severely obese (BMI > 40 or > 35 with obesity - related health condition) with health conditions that include obstructive sleep apnea and hypertension . Weight is improving with lifestyle modifications. BMI  is above average; BMI management plan is completed. We discussed low calorie, low carb based diet program, portion control, increasing exercise, pharmacologic options including wegovy, and an deena-based approach such as Quire Pal or Lose It.     I have instructed the patient to continue with pursuit of medical weight loss as a part of this program. Patient does meet criteria for use of anorectics at this time as BMI >30  and is not at treatment goal.     The current plan for this month includes:   - Continue to work on lifestyle behavioral changes  - Start wegovy.   Start Wegovy. Denies family or personal history of pancreatitis, MTC, or MEN 2. Discussed common side effects of nausea, diarrhea, vomiting, constipation, stomach pain, headache, fatigue, upset stomach, dizziness, feeling bloated, belching, gas, stomach flu, heartburn.   First injection of Wegovy 0.25mg was administered in the office today right upper arm, no complications. Sample pen provided to patient along with content regarding use of the pen, dosing schedule, savings opportunities, and helpful tips.   - Treatment goal >20% loss of beginning body weight, <300lb.         Orders:  -     Semaglutide-Weight Management (Wegovy) 0.25 MG/0.5ML solution auto-injector; Inject 0.5 mL under the skin into the appropriate area as directed 1 (One) Time Per Week.  Dispense: 2 mL; Refill: 0  -     Semaglutide-Weight Management (Wegovy) 0.5 MG/0.5ML solution auto-injector; Inject 0.5 mL under the skin into the appropriate area as directed 1 (One) Time Per Week.  Dispense: 2 mL; Refill: 0    2. Vitamin D deficiency  Assessment &  Plan:  We discussed that adipose tissue sequesters Vitamin D and it often coexists with obesity. Treat with prescription dosing. Reassess in 6 months.         Orders:  -     vitamin D (ERGOCALCIFEROL) 1.25 MG (49072 UT) capsule capsule; Take one capsule by mouth twice weekly. Take one capsule on Monday and one capsule on Thursday.  Dispense: 24 capsule; Refill: 0    3. Muscle cramp  Assessment & Plan:  Intermittent leg cramps. On chlorthalidone. Advised to start potassium supplement.     Orders:  -     potassium gluconate 595 (99 K) MG tablet tablet; Take 1 tablet by mouth Daily.    4. Iron deficiency anemia, unspecified iron deficiency anemia type  Assessment & Plan:  Started prenatal vitamin about 6 weeks ago.           ICD-10-CM ICD-9-CM   1. Class 3 severe obesity due to excess calories with serious comorbidity and body mass index (BMI) of 50.0 to 59.9 in adult  E66.813 278.01    Z68.43 V85.43   2. Vitamin D deficiency  E55.9 268.9   3. Muscle cramp  R25.2 729.82   4. Iron deficiency anemia, unspecified iron deficiency anemia type  D50.9 280.9          Follow Up   Return in about 1 month (around 8/23/2025) for Next scheduled follow up.  Patient was given instructions and counseling regarding her condition or for health maintenance advice. Please see specific information pulled into the AVS if appropriate.     Maliha Galindo, GISELA  07/23/2025

## 2025-08-04 ENCOUNTER — PRIOR AUTHORIZATION (OUTPATIENT)
Dept: BARIATRICS/WEIGHT MGMT | Facility: CLINIC | Age: 30
End: 2025-08-04
Payer: COMMERCIAL